# Patient Record
Sex: FEMALE | Race: WHITE | NOT HISPANIC OR LATINO | Employment: FULL TIME | URBAN - METROPOLITAN AREA
[De-identification: names, ages, dates, MRNs, and addresses within clinical notes are randomized per-mention and may not be internally consistent; named-entity substitution may affect disease eponyms.]

---

## 2021-12-02 ENCOUNTER — OFFICE VISIT (OUTPATIENT)
Dept: INTERNAL MEDICINE CLINIC | Facility: CLINIC | Age: 46
End: 2021-12-02
Payer: COMMERCIAL

## 2021-12-02 VITALS
TEMPERATURE: 98.5 F | DIASTOLIC BLOOD PRESSURE: 70 MMHG | HEART RATE: 84 BPM | RESPIRATION RATE: 18 BRPM | HEIGHT: 69 IN | WEIGHT: 225 LBS | OXYGEN SATURATION: 96 % | SYSTOLIC BLOOD PRESSURE: 120 MMHG | BODY MASS INDEX: 33.33 KG/M2

## 2021-12-02 DIAGNOSIS — Z12.31 SCREENING MAMMOGRAM FOR BREAST CANCER: ICD-10-CM

## 2021-12-02 DIAGNOSIS — F33.9 MAJOR DEPRESSION, RECURRENT, CHRONIC (HCC): Chronic | ICD-10-CM

## 2021-12-02 DIAGNOSIS — F41.9 ANXIETY: Chronic | ICD-10-CM

## 2021-12-02 DIAGNOSIS — Z00.00 ADULT GENERAL MEDICAL EXAMINATION: Primary | ICD-10-CM

## 2021-12-02 DIAGNOSIS — Z11.59 NEED FOR HEPATITIS C SCREENING TEST: ICD-10-CM

## 2021-12-02 DIAGNOSIS — Z13.6 SCREENING FOR CARDIOVASCULAR CONDITION: ICD-10-CM

## 2021-12-02 PROBLEM — F90.9 ADHD: Chronic | Status: ACTIVE | Noted: 2021-12-02

## 2021-12-02 PROCEDURE — 99386 PREV VISIT NEW AGE 40-64: CPT | Performed by: INTERNAL MEDICINE

## 2021-12-02 PROCEDURE — 3008F BODY MASS INDEX DOCD: CPT | Performed by: INTERNAL MEDICINE

## 2021-12-02 PROCEDURE — 3725F SCREEN DEPRESSION PERFORMED: CPT | Performed by: INTERNAL MEDICINE

## 2021-12-02 PROCEDURE — 1036F TOBACCO NON-USER: CPT | Performed by: INTERNAL MEDICINE

## 2021-12-02 RX ORDER — ESCITALOPRAM OXALATE 10 MG/1
10 TABLET ORAL DAILY
COMMUNITY

## 2021-12-02 RX ORDER — LORAZEPAM 0.5 MG/1
0.5 TABLET ORAL DAILY PRN
COMMUNITY
Start: 2021-09-10

## 2021-12-02 RX ORDER — BUPROPION HYDROCHLORIDE 300 MG/1
300 TABLET ORAL
COMMUNITY

## 2021-12-02 RX ORDER — BUPROPION HYDROCHLORIDE 150 MG/1
TABLET ORAL
COMMUNITY
Start: 2021-10-05

## 2022-05-23 ENCOUNTER — OFFICE VISIT (OUTPATIENT)
Dept: OBGYN CLINIC | Age: 47
End: 2022-05-23
Payer: COMMERCIAL

## 2022-05-23 VITALS
DIASTOLIC BLOOD PRESSURE: 82 MMHG | BODY MASS INDEX: 33.86 KG/M2 | WEIGHT: 228.6 LBS | SYSTOLIC BLOOD PRESSURE: 120 MMHG | HEIGHT: 69 IN

## 2022-05-23 DIAGNOSIS — Z01.419 ENCOUNTER FOR GYNECOLOGICAL EXAMINATION (GENERAL) (ROUTINE) WITHOUT ABNORMAL FINDINGS: Primary | ICD-10-CM

## 2022-05-23 PROCEDURE — S0610 ANNUAL GYNECOLOGICAL EXAMINA: HCPCS | Performed by: STUDENT IN AN ORGANIZED HEALTH CARE EDUCATION/TRAINING PROGRAM

## 2022-05-23 NOTE — PROGRESS NOTES
Tyson Oliva  1975    Assessment and Plan:  Yearly exam without abnormality      -Pap up to date, would do 5 year testing given low risk  We reviewed ASCCP guidelines for Pap testing today  Due 2025  -Mammo ordered    RTO one year for yearly exam or sooner as needed  CC:  Yearly exam    S:  52 y o  female here for yearly exam      Menarche: 15years old  G0  Patient's last menstrual period was 04/28/2022 (exact date)  Contraception: none  Last Pap: 2/2020 NILM/HPV- with benign cervical polyp   -denies hx abnormal  Last Mammo: 4/2018 BIRADS2  Last Colonoscopy: 6/2018 with 5 year recall    Former smoker, social drinker  Exercises irregularly    Doing ok overall  Has been having increasing vaginal dryness with vaginal penetration recently  Uses water based lubricant with silicone toys, which is sufficient at this time  Denies hot flushes, dyspareunia, abnormal uterine bleeding, urinary/fecal incontinence, changes in energy levels, mood  Her cycles are regular, not heavy or crampy  Sexual activity: She is sexually active with women at this time, no recent intercourse with men  Denies pain, bleeding or dryness as above  STD testing:  She does not want STD testing today  Gardasil:  She has not had the Gardasil series       Family hx of breast/ovarian cancer: denies  Family hx of colon cancer: PGM      Current Outpatient Medications:     buPROPion (WELLBUTRIN XL) 150 mg 24 hr tablet, TAKE 1 TABLET BY MOUTH 1 TIME A DAY IN THE MORNING, Disp: , Rfl:     buPROPion (WELLBUTRIN XL) 300 mg 24 hr tablet, Take 300 mg by mouth, Disp: , Rfl:     escitalopram (LEXAPRO) 10 mg tablet, Take 10 mg by mouth daily, Disp: , Rfl:     LORazepam (ATIVAN) 0 5 mg tablet, Take 0 5 mg by mouth daily as needed, Disp: , Rfl:   Social History     Socioeconomic History    Marital status: /Civil Union     Spouse name: Not on file    Number of children: Not on file    Years of education: Not on file   Elvin Pina Highest education level: Not on file   Occupational History    Not on file   Tobacco Use    Smoking status: Former Smoker     Start date: 1991     Quit date: 2002     Years since quittin 0    Smokeless tobacco: Never Used   Vaping Use    Vaping Use: Never used   Substance and Sexual Activity    Alcohol use: Yes     Comment: Social    Drug use: Not Currently    Sexual activity: Yes     Partners: Female   Other Topics Concern    Not on file   Social History Narrative    Not on file     Social Determinants of Health     Financial Resource Strain: Not on file   Food Insecurity: Not on file   Transportation Needs: Not on file   Physical Activity: Not on file   Stress: Not on file   Social Connections: Not on file   Intimate Partner Violence: Not on file   Housing Stability: Not on file     Family History   Problem Relation Age of Onset    Hypertension Mother     Ulcerative colitis Sister     Anxiety disorder Sister    [facility] OCD Sister     Colon cancer Paternal Grandmother     Breast cancer Neg Hx       Past Medical History:   Diagnosis Date    Anxiety and depression         Review of Systems   Respiratory: Negative  Cardiovascular: Negative  Gastrointestinal: Negative for constipation and diarrhea  Genitourinary: Negative for difficulty urinating, pelvic pain, vaginal bleeding, vaginal discharge, itching or odor  O:  Blood pressure 120/82, height 5' 9" (1 753 m), weight 104 kg (228 lb 9 6 oz), last menstrual period 2022  Patient appears well and is not in distress  Neck is supple without masses  Breasts are symmetrical without mass, tenderness, nipple discharge, skin changes or adenopathy  Abdomen is soft and nontender without masses  External genitals are normal without lesions or rashes  Urethral meatus and urethra are normal  Bladder is normal to palpation  Vagina is normal without discharge or bleeding  Cervix is normal without discharge or lesion     Uterus is normal, mobile, nontender without palpable mass  Adnexa are normal, nontender, without palpable mass

## 2022-06-07 ENCOUNTER — HOSPITAL ENCOUNTER (OUTPATIENT)
Dept: MAMMOGRAPHY | Facility: CLINIC | Age: 47
Discharge: HOME | End: 2022-06-07
Payer: COMMERCIAL

## 2022-06-07 VITALS — WEIGHT: 224 LBS | HEIGHT: 69 IN | BODY MASS INDEX: 33.18 KG/M2

## 2022-06-07 DIAGNOSIS — Z12.31 SCREENING MAMMOGRAM FOR BREAST CANCER: ICD-10-CM

## 2022-06-07 PROCEDURE — 77063 BREAST TOMOSYNTHESIS BI: CPT

## 2022-06-07 PROCEDURE — 77067 SCR MAMMO BI INCL CAD: CPT

## 2022-06-09 ENCOUNTER — TELEPHONE (OUTPATIENT)
Dept: INTERNAL MEDICINE CLINIC | Facility: CLINIC | Age: 47
End: 2022-06-09

## 2022-06-09 NOTE — TELEPHONE ENCOUNTER
----- Message from Zafar Abdullahi DO sent at 6/9/2022  1:33 PM EDT -----  Let patient know mammogram was normal

## 2022-10-13 ENCOUNTER — TELEPHONE (OUTPATIENT)
Dept: OBGYN CLINIC | Facility: CLINIC | Age: 47
End: 2022-10-13

## 2022-10-13 NOTE — TELEPHONE ENCOUNTER
Pt has had regular menses till about 2 weeks ago  Had a very light period and then had some spotting , off and on  Very heavy bleeding as of 6 AM today  Using a pad and tampon / hr   Pt not pregnant - has female sex partners  RX?   Thank you

## 2022-10-13 NOTE — TELEPHONE ENCOUNTER
I lm for pt - if heavy bleeding persists -call us tomorrow  Otherwise monitor   If irregular bleeding persists to make an appt

## 2022-10-14 NOTE — TELEPHONE ENCOUNTER
Pt called saying that her heavy bleeding is better that it was but it still heavy  Pt would like a call back    Thanks!

## 2022-10-14 NOTE — TELEPHONE ENCOUNTER
Pt taking advil for her heavy bleeding using a pad now every 2 hrs , apt made for nov with you,  has now been feeling very tired, she thinks she is anemic, any suggestions?  Pt will take an iron tablet for now,

## 2022-11-15 ENCOUNTER — OFFICE VISIT (OUTPATIENT)
Dept: OBGYN CLINIC | Age: 47
End: 2022-11-15

## 2022-11-15 VITALS
HEIGHT: 69 IN | WEIGHT: 230.8 LBS | BODY MASS INDEX: 34.18 KG/M2 | SYSTOLIC BLOOD PRESSURE: 114 MMHG | DIASTOLIC BLOOD PRESSURE: 80 MMHG

## 2022-11-15 DIAGNOSIS — N92.0 MENORRHAGIA WITH REGULAR CYCLE: Primary | ICD-10-CM

## 2022-11-15 RX ORDER — TRANEXAMIC ACID 650 MG/1
1300 TABLET ORAL 3 TIMES DAILY
Qty: 30 TABLET | Refills: 1 | Status: SHIPPED | OUTPATIENT
Start: 2022-11-15 | End: 2022-11-20

## 2022-11-15 NOTE — PROGRESS NOTES
Assessment/Plan:     Problem List Items Addressed This Visit        Other    Menorrhagia with regular cycle - Primary    Relevant Medications    Tranexamic Acid 650 MG TABS    Other Relevant Orders    US pelvis complete w transvaginal        - reviewed potential causes of heavy menstrual cycle include menopausal transition, structural, hyperplasia/malignancy, in this order of likely actual cause  Recommend TVUS for evaluation structural  Discussed increased risk hyperplasia with age/BMI, but that with isolated episode, I do not think EMB necessary  With additional episodes however, would recommend EMB  She understands this clearly  - safe and effective of TXA reviewed, in case of recurrence  - Kegels, more invasive treatment for MITRA, reviewed  Recommend Kegels to start, given infrequent nature MITRA    RTO pending bleeding cycle/TVUS    Subjective:      Patient ID: Filbert Duane is a 52 y o  female  HPI  She presents today for discussion regarding recent extremely heavy menstrual cycle  Menses usually regular, not overly heavy  Her last cycle started approximately at the correct time, initially with spotting x1 week, followed by extremely heavy bleeding (through tampon with pad) and passage of clots  This has never happened previously  She is soon due for her next menstrual cycle  Additionally, she notes some stress incontinence prior to menses  The following portions of the patient's history were reviewed and updated as appropriate: allergies, current medications, past family history, past medical history, past social history, past surgical history and problem list     Review of Systems  as above    Objective:  /80 (BP Location: Right arm, Patient Position: Sitting, Cuff Size: Large)   Ht 5' 9" (1 753 m)   Wt 105 kg (230 lb 12 8 oz)   LMP 10/04/2022 (Exact Date)   BMI 34 08 kg/m²      Physical Exam  Vitals reviewed  Constitutional:       Appearance: She is well-developed     HENT:      Head: Normocephalic  Cardiovascular:      Rate and Rhythm: Normal rate  Pulmonary:      Effort: Pulmonary effort is normal    Musculoskeletal:         General: Normal range of motion  Cervical back: Normal range of motion  Neurological:      Mental Status: She is alert and oriented to person, place, and time     Psychiatric:         Behavior: Behavior normal            Overall MDM: moderate   Diagnosis moderate, Data low, Risk moderate

## 2023-02-06 ENCOUNTER — HOSPITAL ENCOUNTER (EMERGENCY)
Facility: HOSPITAL | Age: 48
Discharge: HOME | End: 2023-02-06
Attending: EMERGENCY MEDICINE

## 2023-02-06 ENCOUNTER — APPOINTMENT (EMERGENCY)
Dept: RADIOLOGY | Facility: HOSPITAL | Age: 48
End: 2023-02-06

## 2023-02-06 VITALS
DIASTOLIC BLOOD PRESSURE: 86 MMHG | TEMPERATURE: 98.2 F | HEART RATE: 82 BPM | SYSTOLIC BLOOD PRESSURE: 132 MMHG | RESPIRATION RATE: 18 BRPM | OXYGEN SATURATION: 96 %

## 2023-02-06 DIAGNOSIS — S65.513A LACERATION OF BLOOD VESSEL OF LEFT MIDDLE FINGER, INITIAL ENCOUNTER: ICD-10-CM

## 2023-02-06 DIAGNOSIS — T14.8XXA CRUSH INJURY: ICD-10-CM

## 2023-02-06 DIAGNOSIS — S62.639A CLOSED FRACTURE OF TUFT OF DISTAL PHALANX OF FINGER: Primary | ICD-10-CM

## 2023-02-06 RX ORDER — ACETAMINOPHEN 325 MG/1
650 TABLET ORAL ONCE
Status: COMPLETED | OUTPATIENT
Start: 2023-02-06 | End: 2023-02-06

## 2023-02-06 RX ORDER — BACITRACIN, NEOMYCIN, POLYMYXIN B 400; 3.5; 5 [USP'U]/G; MG/G; [USP'U]/G
1 OINTMENT TOPICAL ONCE
Status: COMPLETED | OUTPATIENT
Start: 2023-02-06 | End: 2023-02-06

## 2023-02-06 RX ORDER — IBUPROFEN 400 MG/1
400 TABLET ORAL ONCE
Status: COMPLETED | OUTPATIENT
Start: 2023-02-06 | End: 2023-02-06

## 2023-02-06 RX ORDER — LIDOCAINE HYDROCHLORIDE AND EPINEPHRINE 10; 10 MG/ML; UG/ML
10 INJECTION, SOLUTION INFILTRATION; PERINEURAL ONCE
Status: COMPLETED | OUTPATIENT
Start: 2023-02-06 | End: 2023-02-06

## 2023-02-06 RX ADMIN — LIDOCAINE HYDROCHLORIDE,EPINEPHRINE BITARTRATE 10 ML: 10; .01 INJECTION, SOLUTION INFILTRATION; PERINEURAL at 10:36

## 2023-02-06 RX ADMIN — IBUPROFEN 400 MG: 400 TABLET ORAL at 10:37

## 2023-02-06 RX ADMIN — NEOMYCIN AND POLYMYXIN B SULFATES AND BACITRACIN ZINC 1 SMALL APPLICATION: 400; 3.5; 5 OINTMENT TOPICAL at 12:17

## 2023-02-06 RX ADMIN — ACETAMINOPHEN 650 MG: 325 TABLET, FILM COATED ORAL at 10:37

## 2023-02-06 NOTE — ED PROVIDER NOTES
History  Chief Complaint   Patient presents with   • Finger Injury     Pt states she got her left hand caught in a sliding door and injured her fingers  Patient is a 49-year-old female with past medical history of anxiety, depression, presents to the emergency department complaining of left hand injury that occurred morning prior to ED arrival   Patient states she got her left hand caught in a sliding door injuring her left third and fourth fingers  She reports pain mostly at the distal end of the fingers as well as the wound to the tip of the third finger  She reports throbbing pain and difficulty moving those fingers due to the pain  She denies any paresthesia or discoloration other than bruising  She denies any pain elsewhere in the hand, wrist joint, forearm or proximal left upper extremity  Patient is up-to-date with tetanus (last booster in 2019)  She denies being on any blood thinners  Patient is RIGHT-hand dominant  Patient denies any other injuries or complaints at this time and rest of ROS is negative  History provided by:  Patient   used: No        Prior to Admission Medications   Prescriptions Last Dose Informant Patient Reported? Taking?    LORazepam (ATIVAN) 0 5 mg tablet   Yes No   Sig: Take 0 5 mg by mouth daily as needed   buPROPion (WELLBUTRIN XL) 150 mg 24 hr tablet   Yes No   Sig: TAKE 1 TABLET BY MOUTH 1 TIME A DAY IN THE MORNING   buPROPion (WELLBUTRIN XL) 300 mg 24 hr tablet   Yes No   Sig: Take 300 mg by mouth   escitalopram (LEXAPRO) 10 mg tablet   Yes No   Sig: Take 10 mg by mouth daily      Facility-Administered Medications: None       Past Medical History:   Diagnosis Date   • Anxiety and depression        Past Surgical History:   Procedure Laterality Date   • BUNIONECTOMY Bilateral        Family History   Problem Relation Age of Onset   • Hypertension Mother    • Ulcerative colitis Sister    • Anxiety disorder Sister    • OCD Sister    • Colon cancer Paternal Grandmother    • Breast cancer Neg Hx      I have reviewed and agree with the history as documented  E-Cigarette/Vaping   • E-Cigarette Use Never User      E-Cigarette/Vaping Substances   • Nicotine No    • THC No    • CBD No    • Flavoring No    • Other No    • Unknown No      Social History     Tobacco Use   • Smoking status: Former     Types: Cigarettes     Start date: 1991     Quit date: 2002     Years since quittin 7   • Smokeless tobacco: Never   Vaping Use   • Vaping Use: Never used   Substance Use Topics   • Alcohol use: Yes     Comment: Social   • Drug use: Not Currently       Review of Systems   Constitutional: Negative for chills and fever  HENT: Negative for congestion, ear pain, rhinorrhea and sore throat  Respiratory: Negative for cough, chest tightness, shortness of breath and wheezing  Cardiovascular: Negative for chest pain and palpitations  Gastrointestinal: Negative for abdominal pain, constipation, diarrhea, nausea and vomiting  Genitourinary: Negative for dysuria, flank pain, frequency and hematuria  Musculoskeletal: Negative for back pain and neck pain  +Left hand (3rd and 4th finger) pain s/p injury  Skin: Positive for wound  Negative for color change, pallor and rash  Allergic/Immunologic: Negative for immunocompromised state  Neurological: Negative for dizziness, syncope, weakness, light-headedness, numbness and headaches  Hematological: Negative for adenopathy  Does not bruise/bleed easily  Psychiatric/Behavioral: Negative for confusion and decreased concentration  All other systems reviewed and are negative  Physical Exam  Physical Exam  Vitals and nursing note reviewed  Constitutional:       General: She is not in acute distress  Appearance: Normal appearance  She is well-developed  She is not ill-appearing, toxic-appearing or diaphoretic  HENT:      Head: Normocephalic and atraumatic        Right Ear: External ear normal       Left Ear: External ear normal       Mouth/Throat:      Comments: Orpharyngeal exam deferred at this time due to risk of exposure to respiratory illness/viruses during peak season  Patient has no oropharyngeal complaints  Eyes:      Extraocular Movements: Extraocular movements intact  Conjunctiva/sclera: Conjunctivae normal    Neck:      Vascular: No JVD  Cardiovascular:      Rate and Rhythm: Normal rate and regular rhythm  Pulses: Normal pulses  Heart sounds: Normal heart sounds  No murmur heard  No friction rub  No gallop  Pulmonary:      Effort: Pulmonary effort is normal  No respiratory distress  Breath sounds: Normal breath sounds  No wheezing, rhonchi or rales  Abdominal:      General: There is no distension  Palpations: Abdomen is soft  Tenderness: There is no abdominal tenderness  There is no guarding or rebound  Musculoskeletal:         General: Tenderness present  No swelling  Normal range of motion  Cervical back: Normal range of motion and neck supple  No rigidity  Comments: LEFT HAND: No tenderness at left wrist joint or hand  There is tenderness of the distal 1/3rd of the 3rd and 4th digits  No deformity  FROM of wrist but decreased ROM at 3rd and 4th fingers  There is a small 1cm laceration/skin avulsion over the tip of the 3rd finger, not actively bleeding  There is a blood blister over the pad of the distal 3rd finger  Very small area of ecchymosis over the palmar aspect of the mid 4th finger  No other lacerations noted  No nail/nail bed injury  Skin:     General: Skin is warm and dry  Coloration: Skin is not pale  Findings: Bruising present  No erythema or rash  Neurological:      General: No focal deficit present  Mental Status: She is alert and oriented to person, place, and time  Sensory: No sensory deficit  Motor: No weakness     Psychiatric:         Mood and Affect: Mood normal          Behavior: Behavior normal          Vital Signs  ED Triage Vitals   Temperature Pulse Respirations Blood Pressure SpO2   02/06/23 1015 02/06/23 1015 02/06/23 1015 02/06/23 1015 02/06/23 1015   98 2 °F (36 8 °C) 82 18 132/86 96 %      Temp Source Heart Rate Source Patient Position - Orthostatic VS BP Location FiO2 (%)   02/06/23 1015 02/06/23 1015 02/06/23 1015 02/06/23 1015 --   Oral Monitor Sitting Left arm       Pain Score       02/06/23 1037       7         Vitals:    02/06/23 1015   BP: 132/86   BP Location: Left arm   Pulse: 82   Resp: 18   Temp: 98 2 °F (36 8 °C)   TempSrc: Oral   SpO2: 96%       Visual Acuity      ED Medications  Medications   neomycin-bacitracin-polymyxin b (NEOSPORIN) ointment 1 small application (has no administration in time range)   ibuprofen (MOTRIN) tablet 400 mg (400 mg Oral Given 2/6/23 1037)   acetaminophen (TYLENOL) tablet 650 mg (650 mg Oral Given 2/6/23 1037)   lidocaine-epinephrine (XYLOCAINE/EPINEPHRINE) 1 %-1:100,000 injection 10 mL (10 mL Infiltration Given 2/6/23 1036)       Diagnostic Studies  Results Reviewed     None                 XR finger third digit-middle LEFT   ED Interpretation by Glenda Michelle DO (02/06 1115)   Acute tuft fracture of the distal 4th phalanx of the left hand  No obvious fracture involving the 3rd digit  XR finger fourth digit-ring LEFT   ED Interpretation by Glenda Michelle DO (02/06 1115)   Acute tuft fracture of the distal 4th phalanx of the left hand  Procedures  Laceration repair    Date/Time: 2/6/2023 12:06 PM  Performed by: Glenda Michelle DO  Authorized by: Glenda Michelle DO   Consent: Verbal consent obtained  Risks and benefits: risks, benefits and alternatives were discussed  Consent given by: patient  Patient understanding: patient states understanding of the procedure being performed  Radiology Images displayed and confirmed   If images not available, report reviewed: imaging studies available  Patient identity confirmed: verbally with patient  Body area: upper extremity  Location details: left long finger  Laceration length: 1 cm  Foreign bodies: no foreign bodies  Tendon involvement: none  Nerve involvement: none  Vascular damage: no  Anesthesia: digital block    Anesthesia:  Local Anesthetic: lidocaine 1% with epinephrine  Anesthetic total: 3 mL    Sedation:  Patient sedated: no        Procedure Details:  Irrigation solution: saline  Irrigation method: jet lavage  Amount of cleaning: standard  Debridement: none  Degree of undermining: none  Skin closure: 4-0 nylon  Number of sutures: 1  Technique: simple  Approximation: loose  Approximation difficulty: simple  Dressing: antibiotic ointment, gauze roll and splint  Patient tolerance: patient tolerated the procedure well with no immediate complications               ED Course  ED Course as of 02/06/23 1212   Mon Feb 06, 2023   1210 Discussed how to take care of the wound and fingers at home including when to get the stitches wet  Discussed signs and symptoms of wound infection and advised her to return immediately if the symptoms develop  Otherwise she will need the single stitch removed in approximately 10 to 12 days  Recommended close follow-up with hand surgery or other orthopedic specialist for the finger fracture  Discussed ED return parameters  1210 Prior to discharge, nurse applied neosporin and gauze wrap of 3rd and 4th fingers as well as aluminum finger splints to both digits  Medical Decision Making  31-year-old female presents to the ED complaining of left hand injury after she got her left hand caught in a sliding door injuring her third and fourth digits  Patient does have a small laceration to the tip of the third digit  We will likely perform digital block for wound exploration and irrigation and possible repair  Will obtain x-ray of the third and fourth digits to rule out acute fracture    We will provide ibuprofen, Tylenol and ice  Patient up-to-date with tetanus already (2019)  Closed fracture of tuft of distal phalanx of finger: acute illness or injury  Crush injury: acute illness or injury  Laceration of blood vessel of left middle finger, initial encounter: acute illness or injury  Amount and/or Complexity of Data Reviewed  Radiology: ordered and independent interpretation performed  Decision-making details documented in ED Course  Risk  OTC drugs  Prescription drug management  Disposition  Final diagnoses:   Closed fracture of tuft of distal phalanx of finger - Left 4th digit   Crush injury   Laceration of blood vessel of left middle finger, initial encounter     Time reflects when diagnosis was documented in both MDM as applicable and the Disposition within this note     Time User Action Codes Description Comment    2/6/2023 12:03 PM Nadege Kimbrough Add [C43 486U] Closed fracture of tuft of distal phalanx of finger     2/6/2023 12:03 PM Nadege Kimbrough Modify [Y68 665P] Closed fracture of tuft of distal phalanx of finger Left 4th digit    2/6/2023 12:04 PM Cristofer VELA Add [T14  8XXA] Crush injury     2/6/2023 12:04 PM Nadege Kimbrough Add [T53 [id number]] Laceration of blood vessel of left middle finger, initial encounter       ED Disposition     ED Disposition   Discharge    Condition   Stable    Date/Time   Mon Feb 6, 2023 12:04 PM    Comment   Iris Maddox discharge to home/self care                 Follow-up Information     Follow up With Specialties Details Why Contact Info Additional [address] [facility] Emergency Department Emergency Medicine Go in 10 days For suture removal or immediately if signs of wound infection develop [address] [place] [id number] [facility] Emergency Department, [address], [place], [facility] [address], MD Orthopedic Surgery, Hand Surgery Schedule an appointment as soon as possible for a visit   [place]  [phone]             Patient's Medications   Discharge Prescriptions    No medications on file       No discharge procedures on file      PDMP Review       Value Time User    PDMP Reviewed  Yes 12/2/2021  3:49 PM Javi Loredo DO          ED Provider  Electronically Signed by           David Bailey DO  02/06/23 [id number]

## 2023-02-06 NOTE — DISCHARGE INSTRUCTIONS
Do not get your stitches wet for 24 hours, after that you may shower/bathe normally and then pat the wound dry and apply antibiotic ointment and new clean dressing  Use the finger splint to keep the fingers protected  Return if there is redness/swelling of wounds, drainage of pus, fevers  Return in 10-12 days for stitch removal  Follow up with hand surgeon for evaluation of your finger fracture

## 2023-02-27 ENCOUNTER — TELEPHONE (OUTPATIENT)
Dept: OBGYN CLINIC | Facility: OTHER | Age: 48
End: 2023-02-27

## 2023-02-27 NOTE — TELEPHONE ENCOUNTER
Patient is being referred to a orthopedics  Please schedule accordingly  [address]   [phone]    I received a Care Request from patient to schedule for:    Where Does it Hurt? Broken left ring finger  Are you considering joint replacement? No   Are you seeking a second opinion? No  If yes, who is your doctor? I lvm to [facility] at [phone]

## 2023-03-04 ENCOUNTER — OFFICE VISIT (OUTPATIENT)
Dept: OBGYN CLINIC | Facility: CLINIC | Age: 48
End: 2023-03-04

## 2023-03-04 ENCOUNTER — HOSPITAL ENCOUNTER (OUTPATIENT)
Dept: RADIOLOGY | Facility: HOSPITAL | Age: 48
Discharge: HOME | End: 2023-03-04

## 2023-03-04 VITALS
OXYGEN SATURATION: 96 % | WEIGHT: 236 LBS | SYSTOLIC BLOOD PRESSURE: 111 MMHG | DIASTOLIC BLOOD PRESSURE: 87 MMHG | HEIGHT: 69 IN | BODY MASS INDEX: 34.96 KG/M2 | HEART RATE: 86 BPM

## 2023-03-04 DIAGNOSIS — S62.639A CLOSED FRACTURE OF TUFT OF DISTAL PHALANX OF FINGER: Primary | ICD-10-CM

## 2023-03-04 DIAGNOSIS — S62.639A CLOSED FRACTURE OF TUFT OF DISTAL PHALANX OF FINGER: ICD-10-CM

## 2023-03-04 NOTE — PROGRESS NOTES
ASSESSMENT/PLAN:    Assessment:   Left ring finger tuft fracture, healing  Left long finger laceration, healed    Plan:   Resume activities as tolerated  Ok to d/c splint  Follow Up:  PRN with Dr Christina Bolanos    To Do Next Visit:       General Discussions:     Fracture - Nonoperative Care: The physiology of a fractured bone was discussed with the patient today  With nondisplaced or minimally displaced fractures, conservative treatment often results in a functional recovery  Typically, these fractures are immobilized in either a cast or splint depending on the pattern  Radiographs are typically taken at intervals throughout the fracture healing to ensure that muscular forces do not cause loss of reduction or alignment  If the fracture loses its alignment, surgical intervention may be required to stabilize it  Medical conditions such as diabetes, osteoporosis, vitamin D deficiency, and a history of or exposure to smoking may delay or prevent fracture healing         _____________________________________________________  CHIEF COMPLAINT:  Chief Complaint   Patient presents with   • Left Ring Finger - Fracture     2/6- caught in door         SUBJECTIVE:  Pretty Hensley is a 52 y o  female who presents for an evaluation of her left ring and long fingers  She was injured a little over a month ago when she closed her hand in a door  She went to the ED the next day  The ring finger had a tuft fracture and she was treated with a splint  The long finger had a laceration and she was treated with a stitch  The stitch was removed 2 weeks later  Since then, she has been doing well  No complaints  She comes in today to make sure everything looked ok      PAST MEDICAL HISTORY:  Past Medical History:   Diagnosis Date   • Anxiety and depression        PAST SURGICAL HISTORY:  Past Surgical History:   Procedure Laterality Date   • BUNIONECTOMY Bilateral        FAMILY HISTORY:  Family History   Problem Relation Age of Onset • Hypertension Mother    • Ulcerative colitis Sister    • Anxiety disorder Sister    • OCD Sister    • Colon cancer Paternal Grandmother    • Breast cancer Neg Hx        SOCIAL HISTORY:  Social History     Tobacco Use   • Smoking status: Former     Types: Cigarettes     Start date: 1991     Quit date: 2002     Years since quittin 8   • Smokeless tobacco: Never   Vaping Use   • Vaping Use: Never used   Substance Use Topics   • Alcohol use: Yes     Comment: Social   • Drug use: Not Currently       MEDICATIONS:    Current Outpatient Medications:   •  buPROPion (WELLBUTRIN XL) 150 mg 24 hr tablet, TAKE 1 TABLET BY MOUTH 1 TIME A DAY IN THE MORNING, Disp: , Rfl:   •  buPROPion (WELLBUTRIN XL) 300 mg 24 hr tablet, Take 300 mg by mouth, Disp: , Rfl:   •  escitalopram (LEXAPRO) 10 mg tablet, Take 10 mg by mouth daily, Disp: , Rfl:   •  LORazepam (ATIVAN) 0 5 mg tablet, Take 0 5 mg by mouth daily as needed, Disp: , Rfl:     ALLERGIES:  No Known Allergies    REVIEW OF SYSTEMS:  Pertinent items are noted in HPI  A comprehensive review of systems was negative      LABS:  HgA1c: No results found for: HGBA1C  BMP: No results found for: GLUCOSE, CALCIUM, NA, K, CO2, CL, BUN, CREATININE      _____________________________________________________  PHYSICAL EXAMINATION:  Vital signs: /87   Pulse 86   Ht 5' 9" (1 753 m)   Wt 107 kg (236 lb)   SpO2 96%   BMI 34 85 kg/m²   General: well developed and well nourished, alert, oriented times 3 and appears comfortable  Psychiatric: Normal  HEENT: Trachea Midline, No torticollis  Cardiovascular: Regular rate and rhythm  Pulmonary: No audible wheezing   Abdomen: No guarding  Extremities: No lymphedema  Skin: No masses, erythema, lacerations, fluctation, ulcerations  Neurovascular: Sensation Intact to the Median, Ulnar, Radial Nerve, Motor Intact to the Median, Ulnar, Radial Nerve and Pulses Intact    MUSCULOSKELETAL EXAMINATION:  LEFT SIDE: Ring finger: No swelling, ecchymosis, erythema, or open skin  Full active ROM  Long finger: No swelling, ecchymosis, erythema, or open skin  The laceration has healed  Full ROM  NVI distally            _____________________________________________________  STUDIES REVIEWED:  Xray demonstrates healing tuft fracture of the ring finger      PROCEDURES PERFORMED:  Procedures  No Procedures performed today

## 2023-11-16 ENCOUNTER — TELEPHONE (OUTPATIENT)
Age: 48
End: 2023-11-16

## 2023-11-16 DIAGNOSIS — H91.90 HEARING LOSS, UNSPECIFIED HEARING LOSS TYPE, UNSPECIFIED LATERALITY: Primary | ICD-10-CM

## 2023-11-16 NOTE — TELEPHONE ENCOUNTER
PT   MADE   APPT  WITH  HEARING   DR MARTELL CASTRO   [facility] HEARING   CENTER   AT  [facility]   PT  NEEDS  A  REFERRAL    FROM  [facility]    HASN'T  BEEN  HERE  SINCE   2021     BUT  WE  MADE   AN  APPT  FOR  NEW   YEAR

## 2023-11-21 ENCOUNTER — OFFICE VISIT (OUTPATIENT)
Dept: AUDIOLOGY | Facility: CLINIC | Age: 48
End: 2023-11-21

## 2023-11-21 DIAGNOSIS — H90.3 SENSORY HEARING LOSS, BILATERAL: Primary | ICD-10-CM

## 2023-11-21 DIAGNOSIS — H91.90 HEARING LOSS, UNSPECIFIED HEARING LOSS TYPE, UNSPECIFIED LATERALITY: ICD-10-CM

## 2023-11-21 PROCEDURE — 92557 COMPREHENSIVE HEARING TEST: CPT | Performed by: AUDIOLOGIST-HEARING AID FITTER

## 2023-11-21 PROCEDURE — 92567 TYMPANOMETRY: CPT | Performed by: AUDIOLOGIST-HEARING AID FITTER

## 2023-11-21 NOTE — PROGRESS NOTES
HEARING EVALUATION    Name:  Rey Alexis  :  1975  Age:  50 y.o. MRN:  [id number]  Date of Evaluation: 23     History: Difficulty Understanding  Reason for visit: Rey Alexis is being seen today at the request of Dr. Chani Cleary for an initial  evaluation of hearing. Patient reports a gradual decrease in hearing sensitivity. She reported occasional tinnitus, a family history of hearing loss, and a history of noise exposure. EVALUATION:    Otoscopic Evaluation:   Right Ear: Unremarkable, canal clear   Left Ear: Unremarkable, canal clear    Tympanometry:   Right Ear: Type A; normal middle ear pressure and static compliance    Left Ear: Type A; normal middle ear pressure and static compliance     Audiometry:  Conventional pure tone audiometry from 250 - 8000 Hz  was obtained with good reliability and revealed the following:     Right Ear: mild to moderate sensorineural hearing loss (SNHL)    Left Ear: mild to moderately-severe sensorineural hearing loss (SNHL)     Speech Audiometry:  Speech Reception (SRT)   Right Ear: 20 dB HL   Left Ear: 20 dB HL    Word Recognition Scores (WRS):  Right Ear: excellent (100 % correct)     Left Ear: excellent (100 % correct)   Stimuli: [id number]    *see attached audiogram      RECOMMENDATIONS:  Return to [facility]. for F/U and Hearing Aid Evaluation    PATIENT EDUCATION:   The results of today's findings were reviewed with Ms. Lamas and her hearing thresholds were explained at length. Treatment options, including amplification and communication strategies, were discussed as appropriate. Ms. Celi Peterson voiced understanding of her test results and had no further questions. Questions were addressed and the patient was encouraged to contact our department should concerns arise. [facility] information provided today.        Susanna Cleaning, [facility]  Clinical Audiologist

## 2023-11-22 ENCOUNTER — HOSPITAL ENCOUNTER (EMERGENCY)
Facility: HOSPITAL | Age: 48
Discharge: HOME | End: 2023-11-22
Payer: COMMERCIAL

## 2023-11-22 VITALS
DIASTOLIC BLOOD PRESSURE: 85 MMHG | SYSTOLIC BLOOD PRESSURE: 134 MMHG | OXYGEN SATURATION: 97 % | RESPIRATION RATE: 14 BRPM | HEART RATE: 63 BPM | TEMPERATURE: 97.6 F

## 2023-11-22 DIAGNOSIS — T78.40XA ACUTE ALLERGIC REACTION, INITIAL ENCOUNTER: Primary | ICD-10-CM

## 2023-11-22 LAB
ANION GAP SERPL CALCULATED.3IONS-SCNC: 5 MMOL/L
ATRIAL RATE: 73 BPM
BASOPHILS # BLD AUTO: 0.02 THOUSANDS/ÂΜL (ref 0–0.1)
BASOPHILS NFR BLD AUTO: 0 % (ref 0–1)
BUN SERPL-MCNC: 23 MG/DL (ref 5–25)
CALCIUM SERPL-MCNC: 9.1 MG/DL (ref 8.4–10.2)
CHLORIDE SERPL-SCNC: 106 MMOL/L (ref 96–108)
CO2 SERPL-SCNC: 27 MMOL/L (ref 21–32)
CREAT SERPL-MCNC: 1.2 MG/DL (ref 0.6–1.3)
EOSINOPHIL # BLD AUTO: 0.11 THOUSAND/ÂΜL (ref 0–0.61)
EOSINOPHIL NFR BLD AUTO: 1 % (ref 0–6)
ERYTHROCYTE [DISTWIDTH] IN BLOOD BY AUTOMATED COUNT: 11.7 % (ref 11.6–15.1)
GFR SERPL CREATININE-BSD FRML MDRD: 53 ML/MIN/1.73SQ M
GLUCOSE SERPL-MCNC: 115 MG/DL (ref 65–140)
HCT VFR BLD AUTO: 38.8 % (ref 34.8–46.1)
HGB BLD-MCNC: 13.2 G/DL (ref 11.5–15.4)
IMM GRANULOCYTES # BLD AUTO: 0.01 THOUSAND/UL (ref 0–0.2)
IMM GRANULOCYTES NFR BLD AUTO: 0 % (ref 0–2)
LYMPHOCYTES # BLD AUTO: 2.28 THOUSANDS/ÂΜL (ref 0.6–4.47)
LYMPHOCYTES NFR BLD AUTO: 28 % (ref 14–44)
MCH RBC QN AUTO: 31.1 PG (ref 26.8–34.3)
MCHC RBC AUTO-ENTMCNC: 34 G/DL (ref 31.4–37.4)
MCV RBC AUTO: 91 FL (ref 82–98)
MONOCYTES # BLD AUTO: 0.71 THOUSAND/ÂΜL (ref 0.17–1.22)
MONOCYTES NFR BLD AUTO: 9 % (ref 4–12)
NEUTROPHILS # BLD AUTO: 4.92 THOUSANDS/ÂΜL (ref 1.85–7.62)
NEUTS SEG NFR BLD AUTO: 62 % (ref 43–75)
NRBC BLD AUTO-RTO: 0 /100 WBCS
P AXIS: 53 DEGREES
PLATELET # BLD AUTO: 306 THOUSANDS/UL (ref 149–390)
PMV BLD AUTO: 8.9 FL (ref 8.9–12.7)
POTASSIUM SERPL-SCNC: 4 MMOL/L (ref 3.5–5.3)
PR INTERVAL: 152 MS
QRS AXIS: 58 DEGREES
QRSD INTERVAL: 88 MS
QT INTERVAL: 386 MS
QTC INTERVAL: 425 MS
RBC # BLD AUTO: 4.25 MILLION/UL (ref 3.81–5.12)
SODIUM SERPL-SCNC: 138 MMOL/L (ref 135–147)
T WAVE AXIS: 29 DEGREES
VENTRICULAR RATE: 73 BPM
WBC # BLD AUTO: 8.05 THOUSAND/UL (ref 4.31–10.16)

## 2023-11-22 PROCEDURE — 80048 BASIC METABOLIC PNL TOTAL CA: CPT | Performed by: PHYSICIAN ASSISTANT

## 2023-11-22 PROCEDURE — 85025 COMPLETE CBC W/AUTO DIFF WBC: CPT | Performed by: PHYSICIAN ASSISTANT

## 2023-11-22 PROCEDURE — 99284 EMERGENCY DEPT VISIT MOD MDM: CPT | Performed by: PHYSICIAN ASSISTANT

## 2023-11-22 PROCEDURE — 93005 ELECTROCARDIOGRAM TRACING: CPT

## 2023-11-22 PROCEDURE — 96374 THER/PROPH/DIAG INJ IV PUSH: CPT

## 2023-11-22 PROCEDURE — 36415 COLL VENOUS BLD VENIPUNCTURE: CPT | Performed by: PHYSICIAN ASSISTANT

## 2023-11-22 PROCEDURE — 96361 HYDRATE IV INFUSION ADD-ON: CPT

## 2023-11-22 PROCEDURE — 93010 ELECTROCARDIOGRAM REPORT: CPT | Performed by: INTERNAL MEDICINE

## 2023-11-22 PROCEDURE — 99284 EMERGENCY DEPT VISIT MOD MDM: CPT

## 2023-11-22 PROCEDURE — 96375 TX/PRO/DX INJ NEW DRUG ADDON: CPT

## 2023-11-22 RX ORDER — PREDNISONE 5 MG/1
TABLET ORAL
Qty: 21 TABLET | Refills: 0 | Status: SHIPPED | OUTPATIENT
Start: 2023-11-22

## 2023-11-22 RX ORDER — METHYLPREDNISOLONE SODIUM SUCCINATE 125 MG/2ML
125 INJECTION, POWDER, LYOPHILIZED, FOR SOLUTION INTRAMUSCULAR; INTRAVENOUS ONCE
Status: COMPLETED | OUTPATIENT
Start: 2023-11-22 | End: 2023-11-22

## 2023-11-22 RX ORDER — DIPHENHYDRAMINE HCL 25 MG
25 TABLET ORAL
Qty: 30 TABLET | Refills: 0 | Status: SHIPPED | OUTPATIENT
Start: 2023-11-22

## 2023-11-22 RX ORDER — ONDANSETRON 2 MG/ML
4 INJECTION INTRAMUSCULAR; INTRAVENOUS ONCE
Status: COMPLETED | OUTPATIENT
Start: 2023-11-22 | End: 2023-11-22

## 2023-11-22 RX ORDER — ONDANSETRON 4 MG/1
4 TABLET, ORALLY DISINTEGRATING ORAL EVERY 6 HOURS PRN
Qty: 20 TABLET | Refills: 0 | Status: SHIPPED | OUTPATIENT
Start: 2023-11-22

## 2023-11-22 RX ORDER — FAMOTIDINE 20 MG/1
20 TABLET, FILM COATED ORAL 2 TIMES DAILY
Qty: 30 TABLET | Refills: 0 | Status: SHIPPED | OUTPATIENT
Start: 2023-11-22

## 2023-11-22 RX ORDER — FAMOTIDINE 10 MG/ML
20 INJECTION, SOLUTION INTRAVENOUS ONCE
Status: COMPLETED | OUTPATIENT
Start: 2023-11-22 | End: 2023-11-22

## 2023-11-22 RX ORDER — DIPHENHYDRAMINE HYDROCHLORIDE 50 MG/ML
25 INJECTION INTRAMUSCULAR; INTRAVENOUS ONCE
Status: COMPLETED | OUTPATIENT
Start: 2023-11-22 | End: 2023-11-22

## 2023-11-22 RX ORDER — HYDROXYZINE HYDROCHLORIDE 25 MG/1
25 TABLET, FILM COATED ORAL 3 TIMES DAILY
Qty: 12 TABLET | Refills: 0 | Status: SHIPPED | OUTPATIENT
Start: 2023-11-22 | End: 2023-11-26

## 2023-11-22 RX ORDER — METOCLOPRAMIDE HYDROCHLORIDE 5 MG/ML
10 INJECTION INTRAMUSCULAR; INTRAVENOUS ONCE
Status: COMPLETED | OUTPATIENT
Start: 2023-11-22 | End: 2023-11-22

## 2023-11-22 RX ADMIN — ONDANSETRON 4 MG: 2 INJECTION INTRAMUSCULAR; INTRAVENOUS at 08:09

## 2023-11-22 RX ADMIN — METOCLOPRAMIDE 10 MG: 5 INJECTION, SOLUTION INTRAMUSCULAR; INTRAVENOUS at 08:30

## 2023-11-22 RX ADMIN — FAMOTIDINE 20 MG: 10 INJECTION, SOLUTION INTRAVENOUS at 08:01

## 2023-11-22 RX ADMIN — DIPHENHYDRAMINE HYDROCHLORIDE 25 MG: 50 INJECTION, SOLUTION INTRAMUSCULAR; INTRAVENOUS at 07:58

## 2023-11-22 RX ADMIN — SODIUM CHLORIDE 1000 ML: 0.9 INJECTION, SOLUTION INTRAVENOUS at 08:30

## 2023-11-22 RX ADMIN — METHYLPREDNISOLONE SODIUM SUCCINATE 125 MG: 125 INJECTION, POWDER, FOR SOLUTION INTRAMUSCULAR; INTRAVENOUS at 07:56

## 2023-11-22 NOTE — ED PROVIDER NOTES
History  Chief Complaint   Patient presents with    Allergic Reaction     Patient started with some hives last night and woke up this morning with more hives along with tongue swelling. Patient is unsure if she has had anything new. 80-year-old female with past medical history significant for anxiety and depression presents emergency department with chief complaint of allergic reaction. Patient reports last night she developed itchy rash to her arms. She took some Benadryl and the symptoms seem to improve. This morning she noticed the rash and itchiness returned and was associated with some tongue swelling. She again took Benadryl and came the emergency department for further evaluation with concerns about allergic reaction. Denies prior similar reactions in the past.  No known allergies. Denies any new foods, soaps, shampoos, detergents, environmental exposures or prescription or over-the-counter medications. History provided by:  Patient   used: No    Allergic Reaction  Presenting symptoms: rash    Presenting symptoms: no difficulty swallowing and no wheezing        Prior to Admission Medications   Prescriptions Last Dose Informant Patient Reported? Taking?    LORazepam (ATIVAN) 0.5 mg tablet  Self Yes No   Sig: Take 0.5 mg by mouth daily as needed   buPROPion (WELLBUTRIN XL) 150 mg 24 hr tablet  Self Yes No   Sig: TAKE 1 TABLET BY MOUTH 1 TIME A DAY IN THE MORNING   buPROPion (WELLBUTRIN XL) 300 mg 24 hr tablet  Self Yes No   Sig: Take 300 mg by mouth   escitalopram (LEXAPRO) 10 mg tablet  Self Yes No   Sig: Take 10 mg by mouth daily      Facility-Administered Medications: None       Past Medical History:   Diagnosis Date    Anxiety and depression        Past Surgical History:   Procedure Laterality Date    BUNIONECTOMY Bilateral        Family History   Problem Relation Age of Onset    Hypertension Mother     Ulcerative colitis Sister     Anxiety disorder Sister     OCD Sister Colon cancer Paternal Grandmother     Breast cancer Neg Hx      I have reviewed and agree with the history as documented. E-Cigarette/Vaping    E-Cigarette Use Never User      E-Cigarette/Vaping Substances    Nicotine No     THC No     CBD No     Flavoring No     Other No     Unknown No      Social History     Tobacco Use    Smoking status: Former     Types: Cigarettes     Start date: 1991     Quit date: 2002     Years since quittin.5    Smokeless tobacco: Never   Vaping Use    Vaping Use: Never used   Substance Use Topics    Alcohol use: Yes     Comment: Social    Drug use: Not Currently     Types: Marijuana     Comment: gummies       Review of Systems   Constitutional:  Negative for fever. HENT:  Negative for trouble swallowing. Respiratory:  Negative for cough, choking, chest tightness, shortness of breath, wheezing and stridor. Cardiovascular:  Negative for chest pain. Gastrointestinal:  Negative for abdominal pain, nausea and vomiting. Musculoskeletal:  Negative for gait problem. Skin:  Positive for rash. Neurological:  Negative for seizures, syncope, facial asymmetry and numbness. All other systems reviewed and are negative. Physical Exam  Physical Exam  Vitals and nursing note reviewed. Constitutional:       General: She is not in acute distress. Appearance: Normal appearance. HENT:      Head: Normocephalic and atraumatic. Right Ear: External ear normal.      Left Ear: External ear normal.      Nose: Nose normal.      Mouth/Throat:      Mouth: Mucous membranes are moist.      Comments: No lip or tongue swelling. Posterior pharynx widely patent. Eyes:      General: No scleral icterus. Right eye: No discharge. Left eye: No discharge. Cardiovascular:      Rate and Rhythm: Normal rate and regular rhythm. Pulses: Normal pulses. Pulmonary:      Effort: Pulmonary effort is normal.      Breath sounds: Normal breath sounds.  No wheezing or rhonchi. Abdominal:      Tenderness: There is no abdominal tenderness. Musculoskeletal:         General: No tenderness, deformity or signs of injury. Cervical back: Normal range of motion and neck supple. Skin:     General: Skin is dry. Coloration: Skin is not jaundiced. Findings: Erythema present. No rash. Comments: Multiple small irregularly shaped slightly raised pruritic lesions to neck, upper extremities and thorax consistent with hives. No petechiae, purpura or pustules. No vesicles. Neurological:      General: No focal deficit present. Mental Status: She is alert and oriented to person, place, and time. Mental status is at baseline. Motor: No weakness. Gait: Gait normal.   Psychiatric:         Mood and Affect: Mood normal.         Behavior: Behavior normal.         Thought Content:  Thought content normal.         Vital Signs  ED Triage Vitals   Temperature Pulse Respirations Blood Pressure SpO2   11/22/23 0749 11/22/23 0749 11/22/23 0747 11/22/23 0747 11/22/23 0749   97.6 °F (36.4 °C) 82 20 133/85 99 %      Temp src Heart Rate Source Patient Position - Orthostatic VS BP Location FiO2 (%)   -- -- -- -- --             Pain Score       --                  Vitals:    11/22/23 0802 11/22/23 0815 11/22/23 0845 11/22/23 1015   BP: 120/78 120/78 134/85    Pulse: 87 71 62 63         Visual Acuity      ED Medications  Medications   Famotidine (PF) (PEPCID) injection 20 mg (20 mg Intravenous Given 11/22/23 0801)   methylPREDNISolone sodium succinate (Solu-MEDROL) injection 125 mg (125 mg Intravenous Given 11/22/23 0756)   diphenhydrAMINE (BENADRYL) injection 25 mg (25 mg Intravenous Given 11/22/23 0758)   ondansetron (ZOFRAN) injection 4 mg (4 mg Intravenous Given 11/22/23 0809)   metoclopramide (REGLAN) injection 10 mg (10 mg Intravenous Given 11/22/23 0830)   sodium chloride 0.9 % bolus 1,000 mL (0 mL Intravenous Stopped 11/22/23 1019)       Diagnostic Studies  Results Reviewed       Procedure Component Value Units Date/Time    Basic metabolic panel [[id number]] Collected: 11/22/23 0830    Lab Status: Final result Specimen: Blood from Arm, Right Updated: 11/22/23 0902     Sodium 138 mmol/L      Potassium 4.0 mmol/L      Chloride 106 mmol/L      CO2 27 mmol/L      ANION GAP 5 mmol/L      BUN 23 mg/dL      Creatinine 1.20 mg/dL      Glucose 115 mg/dL      Calcium 9.1 mg/dL      eGFR 53 ml/min/1.73sq m     Narrative:      [place] guidelines for Chronic Kidney Disease (CKD):     Stage 1 with normal or high GFR (GFR > 90 mL/min/1.73 square meters)    Stage 2 Mild CKD (GFR = 60-89 mL/min/1.73 square meters)    Stage 3A Moderate CKD (GFR = 45-59 mL/min/1.73 square meters)    Stage 3B Moderate CKD (GFR = 30-44 mL/min/1.73 square meters)    Stage 4 Severe CKD (GFR = 15-29 mL/min/1.73 square meters)    Stage 5 End Stage CKD (GFR <15 mL/min/1.73 square meters)  Note: GFR calculation is accurate only with a steady state creatinine    CBC and differential [[id number]] Collected: 11/22/23 0830    Lab Status: Final result Specimen: Blood from Arm, Right Updated: 11/22/23 0840     WBC 8.05 Thousand/uL      RBC 4.25 Million/uL      Hemoglobin 13.2 g/dL      Hematocrit 38.8 %      MCV 91 fL      MCH 31.1 pg      MCHC 34.0 g/dL      RDW 11.7 %      MPV 8.9 fL      Platelets 643 Thousands/uL      nRBC 0 /100 WBCs      Neutrophils Relative 62 %      Immat GRANS % 0 %      Lymphocytes Relative 28 %      Monocytes Relative 9 %      Eosinophils Relative 1 %      Basophils Relative 0 %      Neutrophils Absolute 4.92 Thousands/µL      Immature Grans Absolute 0.01 Thousand/uL      Lymphocytes Absolute 2.28 Thousands/µL      Monocytes Absolute 0.71 Thousand/µL      Eosinophils Absolute 0.11 Thousand/µL      Basophils Absolute 0.02 Thousands/µL                    No orders to display              Procedures  Procedures         ED Course  ED Course as of 11/22/23 1250   Wed Nov 22, 2023   0808 Patient vomited after IV solumedrol. Will add iv zofran for vomiting. SBIRT 22yo+      Flowsheet Row Most Recent Value   Initial Alcohol Screen: US AUDIT-C     1. How often do you have a drink containing alcohol? 0 Filed at: 11/22/2023 0752   2. How many drinks containing alcohol do you have on a typical day you are drinking? 0 Filed at: 11/22/2023 0752   3a. Male UNDER 65: How often do you have five or more drinks on one occasion? 0 Filed at: 11/22/2023 0752   3b. FEMALE Any Age, or MALE 65+: How often do you have 4 or more drinks on one occassion? 0 Filed at: 11/22/2023 0752   Audit-C Score 0 Filed at: 11/22/2023 2256   MUNA: How many times in the past year have you. .. Used an illegal drug or used a prescription medication for non-medical reasons? Never Filed at: 11/22/2023 7524                      Medical Decision Making  ED Coarse: 66-year-old female presents with cutaneous hives associated with mild tongue swelling. Temporary relief with benadryl. Denies wheezing, cough, SOB, fevers, vomiting or difficulty swallowing. DDX:   Differential diagnosis includes but is not limited to angioedema, allergic reaction, asthma exacerbation, anxiety attack, contact dermatitis, vasovagal episode, foreign body aspiration, aspiration, epiglottitis, hyperventilation     Initial ED Plan: IV antihistamines, steroids and H2 blockers. Closely monitor airway, pulse oximetry for any advancing allergy symptoms. MDM:  I have reviewed the patient's vital signs, nursing notes, and other relevant ancillary testing/information. I have had a detailed discussion with the patient regarding the historical points, examination findings, and any diagnostic results    Results:  Reviewed at bedside: BMP normal, no renal failure. Normal electrolytes. CBC with cell count and no anemia. EKG: My initial interpretation: Normal sinus rhythm, rate of 73, no ST elevations.      ED Final Assessment: Acute allergic reaction with cutaneous manifestation. Patient treated and observed in ED for any progression to life threatening anaphylaxis. Pt improved after treatment. No respiratory symptoms. No hypoxia, wheezing or shortness of breath. No cough or difficulty swallowing. Patient given IV steroids, IV Benadryl and famotidine in emergency department. She did develop some vomiting after administration of steroids that improved with IV antiemetics. Observed in ED. Urticarial rash resolved while here in ED. No development of tongue swelling, hypotension or other signs of anaphylaxis. Patient improved after ED treatment. Observed for 2 hours with no recurrence of symptoms. Stable for discharge and outpatient treatment with antihistamines, steroids and H2 blockers. Discussed follow-up with primary care physician for recheck in 3 to 5 days and with allergist in 2 to 4 weeks for further evaluation. Instructed to monitor for signs or symptoms of recurrent rebound reaction and reviewed reasons to return the emergency department. Amount and/or Complexity of Data Reviewed  Labs: ordered. Risk  OTC drugs. Prescription drug management. Disposition  Final diagnoses:   Acute allergic reaction, initial encounter     Time reflects when diagnosis was documented in both MDM as applicable and the Disposition within this note       Time User Action Codes Description Comment    11/22/2023 10:06 AM Binh Connell Add [T78.40XA] Acute allergic reaction, initial encounter           ED Disposition       ED Disposition   Discharge    Condition   Stable    Date/Time   Wed Nov 22, 2023 [id number] [facility] discharge to home/self care.                    Follow-up Information       Follow up With Specialties Details Why Contact Info Additional [address] Tony Cruz DO Internal Medicine Call in 3 days for further evaluation of symptoms [facility] [place] [address] [facility] Emergency Department Emergency Medicine Go to  If symptoms worsen [address] [facility] [phone] [address] [facility] Emergency Department, [place], [place], [address], DO Allergy Call in 2 weeks for further evaluation of symptoms [address] [facility]. [place]               Discharge Medication List as of 11/22/2023 10:11 AM        START taking these medications    Details   diphenhydrAMINE (BENADRYL) 25 mg tablet Take 1 tablet (25 mg total) by mouth daily at bedtime as needed for itching or allergies, Starting Wed 11/22/2023, Normal      famotidine (PEPCID) 20 mg tablet Take 1 tablet (20 mg total) by mouth 2 (two) times a day, Starting Wed 11/22/2023, Normal      hydrOXYzine HCL (ATARAX) 25 mg tablet Take 1 tablet (25 mg total) by mouth 3 (three) times a day for 4 days, Starting Wed 11/22/2023, Until Sun 11/26/2023, Normal      ondansetron (ZOFRAN-ODT) 4 mg disintegrating tablet Take 1 tablet (4 mg total) by mouth every 6 (six) hours as needed for nausea or vomiting for up to 20 doses, Starting Wed 11/22/2023, Normal      predniSONE 5 mg tablet 40 mg PO day 1, then 30 mg PO day 2, 20 mg PO day 3, 10 mg PO day 4, 5 mg PO day 5 then stop, Normal           CONTINUE these medications which have NOT CHANGED    Details   !! buPROPion (WELLBUTRIN XL) 150 mg 24 hr tablet TAKE 1 TABLET BY MOUTH 1 TIME A DAY IN THE MORNING, Historical Med      !! buPROPion (WELLBUTRIN XL) 300 mg 24 hr tablet Take 300 mg by mouth, Historical Med      escitalopram (LEXAPRO) 10 mg tablet Take 10 mg by mouth daily, Historical Med      LORazepam (ATIVAN) 0.5 mg tablet Take 0.5 mg by mouth daily as needed, Starting Fri 9/10/2021, Historical Med       !! - Potential duplicate medications found. Please discuss with provider.           No discharge procedures on file.     PDMP Review         Value Time User    PDMP Reviewed  Yes 12/2/2021  3:49 PM Rhiannon Thornton DO            ED Provider  Electronically Signed by             J Luis Liu PA-C  11/22/23 7734

## 2023-11-22 NOTE — DISCHARGE INSTRUCTIONS
Take hydroxyzine 1 tablet by mouth 3 times a day while awake for itching. At night you may take 1 tablet of Benadryl in place of hydroxyzine for itching/allergies.

## 2023-12-15 ENCOUNTER — TELEPHONE (OUTPATIENT)
Age: 48
End: 2023-12-15

## 2023-12-19 ENCOUNTER — OFFICE VISIT (OUTPATIENT)
Dept: AUDIOLOGY | Facility: CLINIC | Age: 48
End: 2023-12-19
Payer: COMMERCIAL

## 2023-12-19 DIAGNOSIS — H90.3 SENSORY HEARING LOSS, BILATERAL: Primary | ICD-10-CM

## 2023-12-19 PROCEDURE — 92591 HB HEARING AID EXAM BOTH EARS: CPT | Performed by: AUDIOLOGIST-HEARING AID FITTER

## 2023-12-19 NOTE — PROGRESS NOTES
Hearing Aid Evaluation  Name:  Tony Lamas  :  1975  Age:  48 y.o.  MRN:  [id number]  Date of Evaluation: 23     Tony Lamas was seen today for a hearing aid evaluation through [facility].     Hearing Aid Evaluation:      The first hearing aid recommendation is O[employer] real 1 miniRITE-R.      The patient selected the [employer]on Real 1 miniRITE-R hearing aids.    Level: Premium   Color: black     size: Right 2-85 / Left 2-85   Dome size: 8mm OB   Accessories:      OVR paperwork sent to patient's counselor.     Susanna Aleman, CCC-A  Clinical Audiologist  [facility] AUDIOLOGY [place]

## 2024-01-18 ENCOUNTER — OFFICE VISIT (OUTPATIENT)
Age: 49
End: 2024-01-18
Payer: COMMERCIAL

## 2024-01-18 VITALS
TEMPERATURE: 98.2 F | HEART RATE: 82 BPM | HEIGHT: 69 IN | WEIGHT: 236 LBS | RESPIRATION RATE: 18 BRPM | SYSTOLIC BLOOD PRESSURE: 106 MMHG | BODY MASS INDEX: 34.96 KG/M2 | OXYGEN SATURATION: 99 % | DIASTOLIC BLOOD PRESSURE: 74 MMHG

## 2024-01-18 DIAGNOSIS — Z12.11 ENCOUNTER FOR SCREENING FOR MALIGNANT NEOPLASM OF COLON: ICD-10-CM

## 2024-01-18 DIAGNOSIS — F33.42 RECURRENT MAJOR DEPRESSIVE DISORDER, IN FULL REMISSION (HCC): ICD-10-CM

## 2024-01-18 DIAGNOSIS — Z12.31 ENCOUNTER FOR SCREENING MAMMOGRAM FOR MALIGNANT NEOPLASM OF BREAST: ICD-10-CM

## 2024-01-18 DIAGNOSIS — Z11.59 NEED FOR HEPATITIS C SCREENING TEST: ICD-10-CM

## 2024-01-18 DIAGNOSIS — Z00.00 ADULT GENERAL MEDICAL EXAMINATION: Primary | ICD-10-CM

## 2024-01-18 DIAGNOSIS — F41.9 ANXIETY: Chronic | ICD-10-CM

## 2024-01-18 PROCEDURE — 99396 PREV VISIT EST AGE 40-64: CPT | Performed by: INTERNAL MEDICINE

## 2024-01-18 NOTE — PROGRESS NOTES
ADULT ANNUAL PHYSICAL  [facility] CARE [place]    NAME: Tony Lamas  AGE: 48 y.o. SEX: female  : 1975     DATE: 2024     Assessment and Plan:     1. Adult general medical examination  - Basic metabolic panel; Future  - TSH, 3rd generation with Free T4 reflex; Future  - Lipid Panel with Direct LDL reflex; Future    2. Encounter for screening for malignant neoplasm of colon  - Ambulatory Referral to Gastroenterology; Future    3. Encounter for screening mammogram for malignant neoplasm of breast  - Mammo screening bilateral w 3d & cad; Future    4. Recurrent major depressive disorder, in full remission (HCC)    Stable and doing well. Follows with psychiatry.    - TSH, 3rd generation with Free T4 reflex; Future    5. Anxiety    Stable and doing well. Follows with psychiatry.    - TSH, 3rd generation with Free T4 reflex; Future    6. Need for hepatitis C screening test  - Hepatitis C Antibody; Future     Immunizations and preventive care screenings were discussed with patient today. Appropriate education was printed on patient's after visit summary.    Counseling:  Alcohol/drug use: discussed moderation in alcohol intake, the recommendations for healthy alcohol use, and avoidance of illicit drug use.  Dental Health: discussed importance of regular tooth brushing, flossing, and dental visits.  Injury prevention: discussed safety/seat belts, safety helmets, smoke detectors, carbon dioxide detectors, and smoking near bedding or upholstery.  Sexual health: discussed sexually transmitted diseases, partner selection, use of condoms, avoidance of unintended pregnancy, and contraceptive alternatives.  Exercise: the importance of regular exercise/physical activity was discussed. Recommend exercise 3-5 times per week for at least 30 minutes.       Depression Screening and Follow-up Plan: Patient was screened for depression during today's encounter. They screened  negative with a PHQ-9 score of 2.      Return in about 1 year (around 2025) for Annual Physical.     Chief Complaint:     Chief Complaint   Patient presents with    Annual Exam      History of Present Illness:     Adult Annual Physical   Patient here for a comprehensive physical exam. The patient reports she is doing well overall. Went to see audiology and is getting hearing aids.     Depression Screening  PHQ-2/9 Depression Screening    Little interest or pleasure in doing things: 0 - not at all  Feeling down, depressed, or hopeless: 0 - not at all  Trouble falling or staying asleep, or sleeping too much: 0 - not at all  Feeling tired or having little energy: 1 - several days  Poor appetite or overeatin - not at all  Feeling bad about yourself - or that you are a failure or have let yourself or your family down: 0 - not at all  Trouble concentrating on things, such as reading the newspaper or watching television: 1 - several days  Moving or speaking so slowly that other people could have noticed. Or the opposite - being so fidgety or restless that you have been moving around a lot more than usual: 0 - not at all  Thoughts that you would be better off dead, or of hurting yourself in some way: 0 - not at all  PHQ-9 Score: 2  PHQ-9 Interpretation: No or Minimal depression       General Health  Sleep: sleeps well.   Hearing: normal - bilateral.  Vision: goes for regular eye exams and wears glasses.   Dental: regular dental visits and brushes teeth twice daily.       /GYN Health  Follows with gynecology? yes      Review of Systems:     Review of Systems   Constitutional:  Negative for appetite change, chills, fatigue and fever.   HENT:  Positive for hearing loss. Negative for congestion, postnasal drip, rhinorrhea, sore throat, tinnitus and trouble swallowing.    Eyes:  Negative for pain, discharge, redness and visual disturbance.   Respiratory:  Negative for cough, chest tightness, shortness of breath and  wheezing.    Cardiovascular:  Negative for chest pain, palpitations and leg swelling.   Gastrointestinal:  Negative for abdominal distention, abdominal pain, blood in stool, constipation, diarrhea, nausea and vomiting.   Endocrine: Negative for cold intolerance, heat intolerance, polydipsia, polyphagia and polyuria.   Genitourinary:  Negative for difficulty urinating, dysuria, frequency, hematuria and urgency.   Musculoskeletal:  Negative for arthralgias, back pain, gait problem, joint swelling, myalgias, neck pain and neck stiffness.   Skin:  Negative for color change and rash.   Neurological:  Negative for dizziness, tremors, seizures, syncope, speech difficulty, weakness, light-headedness, numbness and headaches.   Hematological:  Negative for adenopathy. Does not bruise/bleed easily.   Psychiatric/Behavioral:  Negative for agitation, behavioral problems, confusion, hallucinations, sleep disturbance and suicidal ideas. The patient is not nervous/anxious.       Past Medical History:     Past Medical History:   Diagnosis Date    Anxiety and depression       Past Surgical History:     Past Surgical History:   Procedure Laterality Date    BUNIONECTOMY Bilateral       Social History:     Social History     Socioeconomic History    Marital status: /Civil Union     Spouse name: None    Number of children: None    Years of education: None    Highest education level: None   Occupational History    None   Tobacco Use    Smoking status: Former     Current packs/day: 0.00     Types: Cigarettes     Start date: 1991     Quit date: 2002     Years since quittin.7    Smokeless tobacco: Never   Vaping Use    Vaping status: Never Used   Substance and Sexual Activity    Alcohol use: Yes     Comment: Social    Drug use: Not Currently     Types: Marijuana     Comment: gummies    Sexual activity: Yes     Partners: Female   Other Topics Concern    None   Social History Narrative    None     Social Determinants of  "Health     Financial Resource Strain: Not on file   Food Insecurity: Not on file   Transportation Needs: Not on file   Physical Activity: Not on file   Stress: Not on file   Social Connections: Not on file   Intimate Partner Violence: Not on file   Housing Stability: Not on file      Family History:     Family History   Problem Relation Age of Onset    Hypertension Mother     Ulcerative colitis Sister     Anxiety disorder Sister     OCD Sister     Colon cancer Paternal Grandmother     Breast cancer Neg Hx       Current Medications:     Current Outpatient Medications   Medication Sig Dispense Refill    buPROPion (WELLBUTRIN XL) 150 mg 24 hr tablet TAKE 1 TABLET BY MOUTH 1 TIME A DAY IN THE MORNING      buPROPion (WELLBUTRIN XL) 300 mg 24 hr tablet Take 300 mg by mouth      diphenhydrAMINE (BENADRYL) 25 mg tablet Take 1 tablet (25 mg total) by mouth daily at bedtime as needed for itching or allergies 30 tablet 0    escitalopram (LEXAPRO) 10 mg tablet Take 10 mg by mouth daily      famotidine (PEPCID) 20 mg tablet Take 1 tablet (20 mg total) by mouth 2 (two) times a day 30 tablet 0    LORazepam (ATIVAN) 0.5 mg tablet Take 0.5 mg by mouth daily as needed       No current facility-administered medications for this visit.      Allergies:     No Known Allergies   Physical Exam:     /74 (BP Location: Left arm, Patient Position: Sitting, Cuff Size: Large)   Pulse 82   Temp 98.2 °F (36.8 °C) (Tympanic)   Resp 18   Ht 5' 9\" (1.753 m)   Wt 107 kg (236 lb)   SpO2 99%   BMI 34.85 kg/m²     Physical Exam  Constitutional:       General: She is not in acute distress.     Appearance: She is obese. She is not ill-appearing.   HENT:      Right Ear: Tympanic membrane, ear canal and external ear normal. There is no impacted cerumen.      Left Ear: Tympanic membrane, ear canal and external ear normal. There is no impacted cerumen.      Mouth/Throat:      Mouth: Mucous membranes are moist.      Pharynx: No oropharyngeal " exudate or posterior oropharyngeal erythema.   Eyes:      General:         Right eye: No discharge.         Left eye: No discharge.      Conjunctiva/sclera: Conjunctivae normal.   Cardiovascular:      Rate and Rhythm: Normal rate and regular rhythm.      Heart sounds: No murmur heard.  Pulmonary:      Effort: Pulmonary effort is normal. No respiratory distress.      Breath sounds: No wheezing.   Abdominal:      General: Bowel sounds are normal. There is no distension.      Tenderness: There is no abdominal tenderness.   Musculoskeletal:      Right lower leg: No edema.      Left lower leg: No edema.   Skin:     General: Skin is warm and dry.   Neurological:      General: No focal deficit present.      Mental Status: She is alert. Mental status is at baseline.      Gait: Gait normal.   Psychiatric:         Mood and Affect: Mood normal.         Behavior: Behavior normal.        Austin Gallegos DO  [facility] PRIMARY CARE [place]

## 2024-01-23 ENCOUNTER — HOSPITAL ENCOUNTER (OUTPATIENT)
Dept: MAMMOGRAPHY | Facility: CLINIC | Age: 49
Discharge: HOME | End: 2024-01-23
Payer: COMMERCIAL

## 2024-01-23 VITALS — WEIGHT: 236 LBS | BODY MASS INDEX: 34.96 KG/M2 | HEIGHT: 69 IN

## 2024-01-23 DIAGNOSIS — Z12.31 ENCOUNTER FOR SCREENING MAMMOGRAM FOR MALIGNANT NEOPLASM OF BREAST: ICD-10-CM

## 2024-01-23 PROCEDURE — 77067 SCR MAMMO BI INCL CAD: CPT

## 2024-01-23 PROCEDURE — 77063 BREAST TOMOSYNTHESIS BI: CPT

## 2024-01-24 ENCOUNTER — TELEPHONE (OUTPATIENT)
Age: 49
End: 2024-01-24

## 2024-01-24 NOTE — TELEPHONE ENCOUNTER
----- Message from [facility] sent at 1/24/2024  1:49 PM EST -----  Mammo was normal

## 2024-01-25 ENCOUNTER — APPOINTMENT (OUTPATIENT)
Dept: LAB | Facility: HOSPITAL | Age: 49
End: 2024-01-25
Payer: COMMERCIAL

## 2024-01-25 DIAGNOSIS — Z11.59 NEED FOR HEPATITIS C SCREENING TEST: ICD-10-CM

## 2024-01-25 DIAGNOSIS — Z00.00 ADULT GENERAL MEDICAL EXAMINATION: ICD-10-CM

## 2024-01-25 LAB
ANION GAP SERPL CALCULATED.3IONS-SCNC: 5 MMOL/L
BUN SERPL-MCNC: 19 MG/DL (ref 5–25)
CALCIUM SERPL-MCNC: 9.5 MG/DL (ref 8.4–10.2)
CHLORIDE SERPL-SCNC: 104 MMOL/L (ref 96–108)
CHOLEST SERPL-MCNC: 199 MG/DL
CO2 SERPL-SCNC: 29 MMOL/L (ref 21–32)
CREAT SERPL-MCNC: 1.03 MG/DL (ref 0.6–1.3)
GFR SERPL CREATININE-BSD FRML MDRD: 64 ML/MIN/1.73SQ M
GLUCOSE P FAST SERPL-MCNC: 91 MG/DL (ref 65–99)
HCV AB SER QL: NORMAL
HDLC SERPL-MCNC: 63 MG/DL
LDLC SERPL CALC-MCNC: 110 MG/DL (ref 0–100)
POTASSIUM SERPL-SCNC: 4.8 MMOL/L (ref 3.5–5.3)
SODIUM SERPL-SCNC: 138 MMOL/L (ref 135–147)
TRIGL SERPL-MCNC: 132 MG/DL

## 2024-01-25 PROCEDURE — 80061 LIPID PANEL: CPT

## 2024-01-25 PROCEDURE — 80048 BASIC METABOLIC PNL TOTAL CA: CPT

## 2024-01-25 PROCEDURE — 36415 COLL VENOUS BLD VENIPUNCTURE: CPT

## 2024-01-25 PROCEDURE — 86803 HEPATITIS C AB TEST: CPT

## 2024-02-21 ENCOUNTER — OFFICE VISIT (OUTPATIENT)
Dept: GASTROENTEROLOGY | Facility: CLINIC | Age: 49
End: 2024-02-21
Payer: COMMERCIAL

## 2024-02-21 VITALS
HEART RATE: 89 BPM | BODY MASS INDEX: 35.22 KG/M2 | OXYGEN SATURATION: 98 % | WEIGHT: 237.8 LBS | SYSTOLIC BLOOD PRESSURE: 120 MMHG | HEIGHT: 69 IN | DIASTOLIC BLOOD PRESSURE: 86 MMHG

## 2024-02-21 DIAGNOSIS — Z83.719 FAMILY HISTORY OF COLONIC POLYPS: ICD-10-CM

## 2024-02-21 DIAGNOSIS — Z80.0 FAMILY HISTORY OF COLON CANCER: Primary | ICD-10-CM

## 2024-02-21 DIAGNOSIS — Z12.11 ENCOUNTER FOR SCREENING FOR MALIGNANT NEOPLASM OF COLON: ICD-10-CM

## 2024-02-21 PROCEDURE — 99203 OFFICE O/P NEW LOW 30 MIN: CPT | Performed by: INTERNAL MEDICINE

## 2024-02-21 NOTE — PATIENT INSTRUCTIONS
Scheduled date of colonoscopy (as of today):3/25/24  Physician performing colonoscopy:Susie  Location of colonoscopy:[place]  Bowel prep reviewed with patient:Abner/Miralax  Instructions reviewed with patient by:Kavin ch  Clearances:  none

## 2024-02-21 NOTE — PROGRESS NOTES
[facility] - Outpatient Consultation  Tony Lamas 48 y.o. female MRN: [id number]  Encounter: [id number]          ASSESSMENT AND PLAN:      1. Encounter for screening for malignant neoplasm of colon  - Ambulatory Referral to Gastroenterology  - Colonoscopy; Future    2. Family history of colon cancer  - Colonoscopy; Future    3. Family history of colonic polyps  - Colonoscopy; Future    ______________________________________________________________________    HPI: Tony is a 48-year-old female who comes the office today to schedule colonoscopy.  Her last colon[place] performed in 2018 [place].  She states that she had no problems with this procedure, the anesthesia, with the bowel prep.  She does have a paternal grandmother who was diagnosed with colon cancer as well as an aunt who was diagnosed with colon polyps.  She denies any problems with diarrhea but she does admit to constipation at times.  She denies abdominal pain, rectal bleeding, nausea, vomiting, melena but she does admit to heartburn sometimes.  There is no dysphagia or odynophagia.      REVIEW OF SYSTEMS:    CONSTITUTIONAL: Denies any fever, chills, rigors, and weight loss.  HEENT: No earache or tinnitus. Denies hearing loss or visual disturbances.  CARDIOVASCULAR: No chest pain or palpitations.   RESPIRATORY: Denies any cough, hemoptysis, shortness of breath or dyspnea on exertion.  GASTROINTESTINAL: As noted in the History of Present Illness.   GENITOURINARY: No problems with urination. Denies any hematuria or dysuria.  NEUROLOGIC: No dizziness or vertigo, denies headaches.   MUSCULOSKELETAL: Denies any muscle or joint pain.   SKIN: Denies skin rashes or itching.   ENDOCRINE: Denies excessive thirst. Denies intolerance to heat or cold.  PSYCHOSOCIAL: Denies depression or anxiety. Denies any recent memory loss.       Historical Information   Past Medical History:   Diagnosis Date    Anxiety and  "depression      Past Surgical History:   Procedure Laterality Date    BUNIONECTOMY Bilateral     WISDOM TOOTH EXTRACTION      3 removed     Social History   Social History     Substance and Sexual Activity   Alcohol Use Yes    Alcohol/week: 2.0 standard drinks of alcohol    Types: 2 Glasses of wine per week    Comment: Social     Social History     Substance and Sexual Activity   Drug Use Not Currently    Types: Marijuana    Comment: gummies     Social History     Tobacco Use   Smoking Status Former    Current packs/day: 0.00    Types: Cigarettes    Start date: 1991    Quit date: 2002    Years since quittin.8   Smokeless Tobacco Never     Family History   Problem Relation Age of Onset    Hypertension Mother     Allergies Father         Environmental    Ulcerative colitis Sister     Anxiety disorder Sister     OCD Sister     Colon cancer Paternal Grandmother     Breast cancer Neg Hx        Meds/Allergies       Current Outpatient Medications:     buPROPion (WELLBUTRIN XL) 150 mg 24 hr tablet    buPROPion (WELLBUTRIN XL) 300 mg 24 hr tablet    escitalopram (LEXAPRO) 10 mg tablet    LORazepam (ATIVAN) 0.5 mg tablet    No Known Allergies        Objective     Blood pressure 120/86, pulse 89, height 5' 9\" (1.753 m), weight 108 kg (237 lb 12.8 oz), SpO2 98%. Body mass index is 35.12 kg/m².        PHYSICAL EXAM:      General Appearance:   Alert, cooperative, no distress   HEENT:   Normocephalic, atraumatic, anicteric.     Neck:  Supple, symmetrical, trachea midline   Lungs:   Clear to auscultation bilaterally; no rales, rhonchi or wheezing; respirations unlabored    Heart::   Regular rate and rhythm; no murmur, rub, or gallop.   Abdomen:   Soft, non-tender, non-distended; normal bowel sounds; no masses, no organomegaly    Genitalia:   Deferred    Rectal:   Deferred    Extremities:  No cyanosis, clubbing or edema    Pulses:  2+ and symmetric    Skin:  No jaundice, rashes, or lesions    Lymph nodes:  No " palpable cervical lymphadenopathy        Lab Results:   No visits with results within 1 Day(s) from this visit.   Latest known visit with results is:   Appointment on 01/25/2024   Component Date Value    Sodium 01/25/2024 138     Potassium 01/25/2024 4.8     Chloride 01/25/2024 104     CO2 01/25/2024 29     ANION GAP 01/25/2024 5     BUN 01/25/2024 19     Creatinine 01/25/2024 1.03     Glucose, Fasting 01/25/2024 91     Calcium 01/25/2024 9.5     eGFR 01/25/2024 64     Hepatitis C Ab 01/25/2024 Non-reactive     Cholesterol 01/25/2024 199     Triglycerides 01/25/2024 132     HDL, Direct 01/25/2024 63     LDL Calculated 01/25/2024 110 (H)          Radiology Results:   Mammo screening bilateral w 3d & cad    Result Date: 1/24/2024  Narrative: DIAGNOSIS: Encounter for screening mammogram for malignant neoplasm of breast TECHNIQUE: Digital screening mammography was performed. Computer Aided Detection (CAD) analyzed all applicable images. COMPARISONS: Prior breast imaging dated: 06/07/2022 and 04/30/2018 RELEVANT HISTORY: Family Breast Cancer History: History of breast cancer in Neg Hx. Family Medical History: Family medical history includes colon cancer in paternal grandmother. Personal History: Hormone history includes birth control. No known relevant surgical history. No known relevant medical history. The patient is scheduled in a reminder system for screening mammography. 8-10% of cancers will be missed on mammography. Management of a palpable abnormality must be based on clinical grounds.  Patients will be notified of their results via letter from our facility. Accredited by American College of Radiology and FDA. RISK ASSESSMENT: 5 Year Tyrer-Cuzick: 0.8% 10 Year Tyrer-Cuzick: 1.71% Lifetime Tyrer-Cuzick: 8.18% TISSUE DENSITY: The breasts are almost entirely fatty. INDICATION: Tony Lamas is a 48 y.o. female presenting for screening mammography. FINDINGS: Bilateral There are no suspicious masses, grouped  microcalcifications or areas of unexplained architectural distortion. The skin and nipple areolar complex are unremarkable.  Few diffusely distributed calcifications are noted in the left breast.     Impression: No mammographic evidence of malignancy. ASSESSMENT/BI-RADS CATEGORY: Left: 2 - Benign Right: 2 - Benign Overall: 2 - Benign RECOMMENDATION:      - Routine screening mammogram in 1 year for both breasts. Workstation ID: [id number]

## 2024-03-19 ENCOUNTER — OFFICE VISIT (OUTPATIENT)
Dept: AUDIOLOGY | Age: 49
End: 2024-03-19
Payer: COMMERCIAL

## 2024-03-19 ENCOUNTER — TELEPHONE (OUTPATIENT)
Dept: GASTROENTEROLOGY | Facility: CLINIC | Age: 49
End: 2024-03-19

## 2024-03-19 DIAGNOSIS — H90.3 SENSORY HEARING LOSS, BILATERAL: Primary | ICD-10-CM

## 2024-03-19 PROCEDURE — V5261 HEARING AID, DIGIT, BIN, BTE: HCPCS | Performed by: AUDIOLOGIST

## 2024-03-19 PROCEDURE — V5160 DISPENSING FEE BINAURAL: HCPCS | Performed by: AUDIOLOGIST

## 2024-03-19 NOTE — PROGRESS NOTES
Hearing Aid Fitting    Name:  Tony Lamas  :  1975  Age:  48 y.o.  MRN:  [id number]  Date of Evaluation: 24     HISTORY:    Tony Lamas was seen today for a binaural hearing aid fitting of her Oticon Real 1 miniRITE-R  in the canal (JIM) hearing aid(s). Tony was unaccompanied to today's visit. Hearing aid purchase is being paid by [employer] with patient contribution.    DEVICE INFORMATION:     Left Device Right Device   Hearing Aid Make: Oticon  Oticon    Hearing Aid Model: Real 1 miniRITE-R Real 1 miniRITE-R   Serial Number: [id number]   Repair Warranty Date: 3/30/2027 3/30/2027   Loss/Damage Warranty Status: Active  Active        Length/Output 2-85 2-85   Wax System: ProWax miniFit ProWax miniFit   Dome Size/Style: 6 open bass 6 open bass   Battery: Lithium-ion Rechargeable Lithium-ion Rechargeable      Earmold Serial Number: N/A N/A   Earmold Warranty Date:  N/A N/A    Serial Number:  [id number], [id number]    Warranty Date:  3/30/2027     Accessories: N/A       DEVICE SETTINGS:    Hearing aid(s) were programmed using NAL-NL2 fitting formula and were adjusted based on patient perceived comfort level. Hearing aid(s) were set to experience level 2  per patient subjective listening preference. The patient noted good sound quality, and was happy with the overall sound quality and fit of the hearing aid(s).    Real Ear Measures confirmed hearing aids meet NAL-NL2 targets at full prescription (Step 3).    DEVICE ORIENTATION:    The patient  was counseled on device components and component function. Proper insertion and removal of the aid(s) was demonstrated. The patient practiced insertion and removal of the devices in the office, they demonstrated excellent ability to manipulate the hearing aids. Patient was given the devices users manual that reviews aid usage and operation, hearing aid cleaning tools, and hearing aid carrying case.     The hearing aid warranty,  including unlimited repair and a one time loss and damage per hearing aid, through the , as well as [facility]'s hearing aid service plan, including unlimited office visits, and supplies were outlined thoroughly. The patient agreed to the terms of sale listed on the purchase agreement containing device specifications, warranties, pricing information, as well as [facility]'s 45-day trial period timeline. After this period has elapsed, hearing aids cannot be returned.    The patients hearing aid(s) were connected to their cell phone via bluetooth. Patient demonstrated excellent understanding of the clem and bluetooth capabilities of the hearing aids.      DEVICE EXPECTATIONS & USAGE:     Hearing aids are assistive devices and are not designed to restore normal hearing sensitivity. The importance of realistic expectations, especially in the presence of background noise, was emphasized. The need for daily, consistent usage (8-12 hours per day) for proper device adjustment, as well as the importance of self-advocacy and practicing effective communication strategies was outlined.     Effective communication strategies include:  1.) Maintaining face-to-face communication, allowing for speechreading of facial expressions, lips, and gestures.  2.) Reducing background noise and distance between communication partners.  3.) Having communication partners reduce their rate of speech when appropriate.  4.) Beginning conversation by getting communication partner's attention.  5.) Asking for rephrasing of missed aspects of conversation rather than asking for repetition.    RECOMMENDATIONS:  The patient demonstrated understanding of all the topics discussed. The patientis to follow-up in 2-3 weeks for a hearing aid check within the trial period as scheduled.     Mel St., CCC-A  Clinical Audiologist   [facility]  [facility] [place] [phone]

## 2024-04-03 ENCOUNTER — OFFICE VISIT (OUTPATIENT)
Dept: AUDIOLOGY | Age: 49
End: 2024-04-03

## 2024-04-03 DIAGNOSIS — H90.3 SENSORY HEARING LOSS, BILATERAL: Primary | ICD-10-CM

## 2024-04-03 NOTE — PROGRESS NOTES
Hearing Aid Visit:    Name:  Tony Lamas  :  1975  Age:  48 y.o.  MRN:  [id number]  Date of Evaluation: 24     HISTORY:    Tony Lamas was seen today (4/3/2024) for a(n) in-warranty hearing aid check of her bilateral hearing aids. Today, Tony reports she is happy with the aids but does continue to need repetition on occasion, and still struggles in background noise    DEVICE INFORMATION:      Left Device Right Device   Hearing Aid Make: [employer]  Oticon    Hearing Aid Model: Real 1 miniRITE-R Real 1 miniRITE-R   Serial Number: [id number]   Repair Warranty Date: 3/30/2027 3/30/2027   Loss/Damage Warranty Status: Active  Active         Length/Output 2-85 2-85   Wax System: [employer] miniFit ProWax miniFit   Dome Size/Style: 6 open bass 6 open bass   Battery: Lithium-ion Rechargeable Lithium-ion Rechargeable       Earmold Serial Number: N/A N/A   Earmold Warranty Date:  N/A N/A    Serial Number:  [id number], [id number]    Warranty Date:  3/30/2027     Accessories: N/A       ACTION/ADJUSTMENTS:    Increased to Step 3 - full prescription.  Added speech in noise program with full directional for patient to try in difficult environments.     RECOMMENDATIONS:     Return in 6 months or sooner if needed.      Mel St., CCC-A  Clinical Audiologist  [facility]  [address]  [place] [address]

## 2024-05-08 ENCOUNTER — TELEPHONE (OUTPATIENT)
Age: 49
End: 2024-05-08

## 2024-05-08 NOTE — TELEPHONE ENCOUNTER
Pt callEDtresa r/s colonoscopy. Colonoscopy r/s from 5/13 to 6/10/2024 with Dr. Richards at the [facility].

## 2024-06-08 ENCOUNTER — TELEPHONE (OUTPATIENT)
Age: 49
End: 2024-06-08

## 2024-08-01 ENCOUNTER — APPOINTMENT (OUTPATIENT)
Age: 49
End: 2024-08-01
Payer: OTHER MISCELLANEOUS

## 2024-08-01 PROCEDURE — G0382 LEV 3 HOSP TYPE B ED VISIT: HCPCS | Performed by: PHYSICIAN ASSISTANT

## 2024-08-01 PROCEDURE — 99283 EMERGENCY DEPT VISIT LOW MDM: CPT | Performed by: PHYSICIAN ASSISTANT

## 2024-08-06 ENCOUNTER — APPOINTMENT (OUTPATIENT)
Age: 49
End: 2024-08-06
Payer: OTHER MISCELLANEOUS

## 2024-08-06 PROCEDURE — 99213 OFFICE O/P EST LOW 20 MIN: CPT | Performed by: FAMILY MEDICINE

## 2025-07-09 ENCOUNTER — TELEPHONE (OUTPATIENT)
Age: 50
End: 2025-07-09